# Patient Record
Sex: OTHER/UNKNOWN | URBAN - METROPOLITAN AREA
[De-identification: names, ages, dates, MRNs, and addresses within clinical notes are randomized per-mention and may not be internally consistent; named-entity substitution may affect disease eponyms.]

---

## 2018-09-04 ENCOUNTER — APPOINTMENT (OUTPATIENT)
Dept: URBAN - METROPOLITAN AREA CLINIC 290 | Age: 58
Setting detail: DERMATOLOGY
End: 2018-09-08

## 2018-09-04 DIAGNOSIS — Z41.9 ENCOUNTER FOR PROCEDURE FOR PURPOSES OTHER THAN REMEDYING HEALTH STATE, UNSPECIFIED: ICD-10-CM

## 2018-09-04 PROCEDURE — OTHER BOTOX (U OR CC): OTHER

## 2018-09-04 PROCEDURE — OTHER OTHER (COSMETIC): OTHER

## 2018-09-04 NOTE — PROCEDURE: OTHER (COSMETIC)
Other (Free Text): Recommended filler to the forehead instead of Botox. Patient is interested in filler to perioral area and will book a cosmetic consultation appointment
Detail Level: Zone

## 2018-09-04 NOTE — PROCEDURE: BOTOX (U OR CC)
Additional Area 4 Location: [facility] Additional Area 4 Location: [facility]